# Patient Record
Sex: MALE
[De-identification: names, ages, dates, MRNs, and addresses within clinical notes are randomized per-mention and may not be internally consistent; named-entity substitution may affect disease eponyms.]

---

## 2021-11-11 PROBLEM — Z00.00 ENCOUNTER FOR PREVENTIVE HEALTH EXAMINATION: Status: ACTIVE | Noted: 2021-11-11

## 2022-01-25 ENCOUNTER — APPOINTMENT (OUTPATIENT)
Dept: NEUROSURGERY | Facility: CLINIC | Age: 37
End: 2022-01-25
Payer: SELF-PAY

## 2022-01-25 DIAGNOSIS — H93.A9 PULSATILE TINNITUS, UNSPECIFIED EAR: ICD-10-CM

## 2022-01-25 PROCEDURE — EDU01: CPT

## 2022-01-30 NOTE — REASON FOR VISIT
[Home] : at home, [unfilled] , at the time of the visit. [Medical Office: ([facility])___] : at the medical office located in  [Verbal consent obtained from patient] : the patient, [unfilled] [New Patient Visit] : a new patient visit [FreeTextEntry1] : Pulsatile Tinnitus

## 2022-01-30 NOTE — ASSESSMENT
[FreeTextEntry1] : Impression:\par Bilateral Pulsatile Tinnitus\par Multiple rhythmic Sounds\par Started after a specific neck movement\par No Headaches or Vision Complaints\par \par We discussed possible causes of pulsatile tinnitus including:\par ENT causes such as semicircular canal dehiscence, tumor, ototoxicity\par Cardiac causes such as aortic stenosis, valve disease, HTN, other causes of heart murmur\par Anemia\par Thyroid disease\par Cerebrovascular causes such as arteriovenous malformation (AVM), dural arteriovenous fistula (dAVF), venous sinus stenosis, venous aneurysm, large trans-mastoid emissary vein, carotid stenosis, jugular bulb diverticulum \par \par MRA reveals no dural AVF or AVM\par \par Based on clinical presentation and imaging review, I do not think the source of the sound is from a cerebrovascular cause.  I recommend further evaluation with an MRV to look at the veins in the head.\par \par Plan:\par MRV Head w/wo to r/o venous sinus stenosis\par Physical Therapy for Neck/ C-spine\par Follow Up with Me After Imaging

## 2022-01-30 NOTE — HISTORY OF PRESENT ILLNESS
[de-identified] : I conducted a remote educational consult with GALLITO NAVARRO on 01/25/2022. I explained that I am only able to provide an educational consult and not render treatment as I am not licensed in the state in which [place] is located. A written informed consent for the educational consult was obtained and is included in the EHR. GALLITO NAVARRO is informed that there would be a $250 cost associated with the conduct of the remote educational consult. \par \par \par Mr. NAVARRO is a pleasant 36 year old male who presents today with a chief complaint of  ear pulsatile tinnitus.  It first started in 3/2020 and her did a specific movement with his neck.  It is described as a constant, hissing/screeching sound that is rhythmic.  There are multiple sounds in his ears including ringing.  The noise resolves when he sleeps on his right side.  The sounds get louder when he tucks his chin to his chest.  He denies any headaches, blurry vision or diplopia.\par \par